# Patient Record
Sex: MALE | Race: WHITE | NOT HISPANIC OR LATINO | Employment: FULL TIME | URBAN - NONMETROPOLITAN AREA
[De-identification: names, ages, dates, MRNs, and addresses within clinical notes are randomized per-mention and may not be internally consistent; named-entity substitution may affect disease eponyms.]

---

## 2020-04-16 ENCOUNTER — OFFICE VISIT (OUTPATIENT)
Dept: URGENT CARE | Facility: CLINIC | Age: 45
End: 2020-04-16
Payer: COMMERCIAL

## 2020-04-16 VITALS
OXYGEN SATURATION: 97 % | SYSTOLIC BLOOD PRESSURE: 148 MMHG | HEIGHT: 71 IN | TEMPERATURE: 98.3 F | WEIGHT: 157 LBS | DIASTOLIC BLOOD PRESSURE: 96 MMHG | RESPIRATION RATE: 14 BRPM | HEART RATE: 93 BPM | BODY MASS INDEX: 21.98 KG/M2

## 2020-04-16 DIAGNOSIS — K52.9 AGE (ACUTE GASTROENTERITIS): ICD-10-CM

## 2020-04-16 PROCEDURE — 99203 OFFICE O/P NEW LOW 30 MIN: CPT | Performed by: FAMILY MEDICINE

## 2020-04-16 RX ORDER — LOPERAMIDE HYDROCHLORIDE 2 MG/1
2 CAPSULE ORAL 4 TIMES DAILY PRN
Qty: 30 CAP | Refills: 0 | Status: SHIPPED
Start: 2020-04-16

## 2020-04-16 NOTE — PROGRESS NOTES
"  Chief Complaint   Patient presents with   • Diarrhea                  Pt is here for \"work clearance\".    States that he had n/v/d for 2 days earlier this week.   Now feels much better.   No diarrhea or emesis for past 48 hrs     Able to tolerate normal diet.   No fever or cough             Past Medical History:   Diagnosis Date   • Abdominal pain, acute 2014   • Asthma    • Heart burn    • Hypertension    • Indigestion    • Kidney infection 2014   • Snoring        Social History     Tobacco Use   • Smoking status: Current Every Day Smoker     Packs/day: 1.50     Years: 21.00     Pack years: 31.50     Types: Cigarettes   • Smokeless tobacco: Never Used   Substance Use Topics   • Alcohol use: Yes   • Drug use: Yes     Types: Marijuana                  Review of Systems   Constitutional: Negative for fever, chills and weight loss.   Respiratory: Negative for cough and wheezing.    Cardiovascular: Negative for chest pain.   Gastrointestinal:   Negative for  blood in stool.   Neurological: Negative for dizziness and headaches.   All other systems reviewed and are negative.         Objective:     /96 (BP Location: Right arm, Patient Position: Sitting)   Pulse 93   Temp 36.8 °C (98.3 °F)   Resp 14   Ht 1.803 m (5' 11\")   Wt 71.2 kg (157 lb)   SpO2 97%       Physical Exam   Constitutional: pt is oriented to person, place, and time and appears well-developed. No distress.   HENT:   Head: Normocephalic and atraumatic.   Mouth/Throat: No oropharyngeal exudate.   Eyes: Conjunctivae are normal. No scleral icterus.   Cardiovascular: Normal rate, regular rhythm and normal heart sounds.    Pulmonary/Chest: Effort normal and breath sounds normal. No respiratory distress. Pt has no wheezes. Pt has no rales.   Abdominal: Normal appearance and bowel sounds are normal. There is no splenomegaly or hepatomegaly. There is no tenderness. There is no rebound, no guarding, no CVA tenderness and no tenderness at McBurney's " point.   Lymphadenopathy:     Pt has no cervical adenopathy.   Neurological: pt is alert and oriented to person, place, and time.   Skin: Skin is warm. Pt is not diaphoretic. No erythema.   Psychiatric:  behavior is normal.   Nursing note and vitals reviewed.              Assessment/Plan:         1. Viral gastroenteritis  Now resolved  Cleared for work

## 2020-04-16 NOTE — LETTER
April 16, 2020         Patient: Maroc Rivera   YOB: 1975   Date of Visit: 4/16/2020           To Whom it May Concern:    Marco Rivera was seen in my clinic on 4/16/2020. He may return to work on 4/16.    Please excuse him for his recent, prior work absence.    If you have any questions or concerns, please don't hesitate to call.        Sincerely,           Pierre Farah M.D.  Electronically Signed

## 2020-09-21 ENCOUNTER — OFFICE VISIT (OUTPATIENT)
Dept: URGENT CARE | Facility: CLINIC | Age: 45
End: 2020-09-21
Payer: COMMERCIAL

## 2020-09-21 VITALS
BODY MASS INDEX: 20.55 KG/M2 | HEART RATE: 72 BPM | WEIGHT: 146.8 LBS | DIASTOLIC BLOOD PRESSURE: 84 MMHG | HEIGHT: 71 IN | TEMPERATURE: 96.9 F | OXYGEN SATURATION: 94 % | SYSTOLIC BLOOD PRESSURE: 130 MMHG

## 2020-09-21 DIAGNOSIS — H00.036 EYELID ABSCESS, LEFT: ICD-10-CM

## 2020-09-21 PROCEDURE — 99214 OFFICE O/P EST MOD 30 MIN: CPT | Performed by: PHYSICIAN ASSISTANT

## 2020-09-21 RX ORDER — GABAPENTIN 300 MG/1
300 CAPSULE ORAL 3 TIMES DAILY
COMMUNITY

## 2020-09-21 RX ORDER — SULFAMETHOXAZOLE AND TRIMETHOPRIM 800; 160 MG/1; MG/1
1 TABLET ORAL 2 TIMES DAILY
Qty: 14 TAB | Refills: 0 | Status: SHIPPED | OUTPATIENT
Start: 2020-09-21 | End: 2020-09-28

## 2020-09-21 ASSESSMENT — ENCOUNTER SYMPTOMS
BLURRED VISION: 1
EYE DISCHARGE: 1
VOMITING: 0
EYE REDNESS: 1
NAUSEA: 0
EYE ITCHING: 0
EYE PAIN: 0
DOUBLE VISION: 0
FOREIGN BODY SENSATION: 0
FEVER: 0
PHOTOPHOBIA: 0

## 2020-09-21 NOTE — PROGRESS NOTES
Subjective:   Marco Rivera is a 45 y.o. male who presents today with   Chief Complaint   Patient presents with   • Eye Problem     (L) eye x 2 weeks; swollen, bump on top of eyelid; discharge       Eye Problem   The left eye is affected. This is a new problem. The current episode started 1 to 4 weeks ago. The problem occurs constantly. The problem has been gradually worsening. There was no injury mechanism. The pain is mild. There is no known exposure to pink eye. He does not wear contacts. Associated symptoms include blurred vision (mild occasional), an eye discharge and eye redness. Pertinent negatives include no double vision, fever, foreign body sensation, itching, nausea, photophobia, recent URI or vomiting. Treatments tried: warm compress. The treatment provided no relief.   Patient denies any injury or trauma and states he has had this in the past.  He says it feels like he has an ingrown hair near his eyebrow which started the swelling and redness.  They tried to pluck the area with tweezers.    PMH:  has a past medical history of Abdominal pain, acute (2014), Asthma, Heart burn, Hypertension, Indigestion, Kidney infection (2014), and Snoring.  MEDS:   Current Outpatient Medications:   •  gabapentin (NEURONTIN) 300 MG Cap, Take 300 mg by mouth 3 times a day., Disp: , Rfl:   •  sulfamethoxazole-trimethoprim (BACTRIM DS) 800-160 MG tablet, Take 1 Tab by mouth 2 times a day for 7 days., Disp: 14 Tab, Rfl: 0  •  albuterol (VENTOLIN OR PROVENTIL) 108 (90 BASE) MCG/ACT AERS inhalation aerosol, Inhale 2 Puffs by mouth every 6 hours as needed., Disp: , Rfl:   •  amlodipine (NORVASC) 5 MG TABS, Take 5 mg by mouth every day., Disp: , Rfl:   •  metoprolol (LOPRESSOR) 50 MG TABS, Take 50 mg by mouth 2 times a day., Disp: , Rfl:   •  Insulin Glargine (LANTUS SC), Inject  as instructed., Disp: , Rfl:   •  Insulin Regular Human (HUMULIN R INJ), by Injection route., Disp: , Rfl:   •  metoprolol (LOPRESSOR) 50 MG  TABS, Take 50 mg by mouth every day., Disp: , Rfl:   •  loperamide (IMODIUM) 2 MG Cap, Take 1 Cap by mouth 4 times a day as needed for Diarrhea. (Patient not taking: Reported on 9/21/2020), Disp: 30 Cap, Rfl: 0  •  Pancrelipase, Lip-Prot-Amyl, (VIOKACE) 47942 UNITS TABS, Take 1 Tab by mouth 4 times a day as needed. (Patient not taking: Reported on 9/21/2020), Disp: 120 Tab, Rfl: 0  •  oxycodone immediate release (ROXICODONE) 10 MG immediate release tablet, Take 1 Tab by mouth every four hours as needed for Moderate Pain ((4-6)). (Patient not taking: Reported on 9/21/2020), Disp: 30 Tab, Rfl: 0  •  HYDROmorphone (DILAUDID) 4 MG TABS, Take 0.5 Tabs by mouth every 3 hours as needed for Severe Pain. (Patient not taking: Reported on 9/21/2020), Disp: 30 Tab, Rfl: 0  •  lisinopril (PRINIVIL) 20 MG TABS, Take 20 mg by mouth every day., Disp: , Rfl:   •  pantoprazole (PROTONIX) 40 MG TBEC, Take 40 mg by mouth every day., Disp: , Rfl:   •  clotrimazole-betamethasone (LOTRISONE) 1-0.05 % CREA, Apply 1 Application to affected area(s) 2 times a day., Disp: , Rfl:   •  potassium chloride SA (K-DUR) 20 MEQ TBCR, Take 20 mEq by mouth 2 times a day., Disp: , Rfl:   •  pantoprazole (PROTONIX) 20 MG tablet, Take 20 mg by mouth 2 times a day., Disp: , Rfl:   •  lisinopril-hydrochlorothiazide (PRINZIDE, ZESTORETIC) 20-12.5 MG per tablet, Take 1 Tab by mouth every day., Disp: , Rfl:   •  amlodipine (NORVASC) 1 mg/mL SUSP, Take 5 mg by mouth every day., Disp: , Rfl:   •  hydrocodone/acetaminophen (NORCO)  MG TABS, Take 1-2 Tabs by mouth every 6 hours as needed., Disp: , Rfl:   ALLERGIES:   Allergies   Allergen Reactions   • Augmentin Rash and Nausea     rash   • Amoxicillin      Rash     • Augmentin    • Morphine      Abdominal pain     SURGHX:   Past Surgical History:   Procedure Laterality Date   • GASTROSCOPY WITH BIOPSY  12/5/2014    Performed by Brain Pelletier M.D. at [facility]   • EGD W/ENDOSCOPIC ULTRASOUND   "12/5/2014    Performed by Brain Pelletier M.D. at SUR[facility]   • CERVICAL FUSION POSTERIOR       SOCHX:  reports that he has been smoking cigarettes. He has a 31.50 pack-year smoking history. He has never used smokeless tobacco. He reports current alcohol use. He reports current drug use. Drug: Marijuana.  FH: Reviewed with patient, not pertinent to this visit.       Review of Systems   Constitutional: Negative for fever.   Eyes: Positive for blurred vision (mild occasional), discharge and redness. Negative for double vision, photophobia, pain and itching.        Eyelid pain (left)   Gastrointestinal: Negative for nausea and vomiting.   All other systems reviewed and are negative.       Objective:   /84 (BP Location: Right arm, Patient Position: Sitting)   Pulse 72   Temp 36.1 °C (96.9 °F) (Temporal)   Ht 1.803 m (5' 11\")   Wt 66.6 kg (146 lb 12.8 oz)   SpO2 94%   BMI 20.47 kg/m²   Physical Exam  Vitals signs and nursing note reviewed.   Constitutional:       General: He is not in acute distress.     Appearance: Normal appearance. He is well-developed.   HENT:      Head: Normocephalic and atraumatic.      Right Ear: Hearing normal.      Left Ear: Hearing normal.   Eyes:      General: No scleral icterus.        Right eye: Discharge present. No foreign body.         Left eye: Discharge present.No foreign body.      Extraocular Movements: Extraocular movements intact.      Conjunctiva/sclera:      Right eye: Right conjunctiva is injected (mild).      Left eye: Left conjunctiva is injected (mild).      Pupils: Pupils are equal, round, and reactive to light.        Comments: No signs of entrapment of the left eye. 2 cm area of swelling and erythema to the left eyelid, TTP to the area.   Cardiovascular:      Rate and Rhythm: Normal rate and regular rhythm.      Heart sounds: Normal heart sounds.   Pulmonary:      Effort: Pulmonary effort is normal.   Musculoskeletal:      Comments: Normal " movement in all 4 extremities   Skin:     General: Skin is warm and dry.   Neurological:      Mental Status: He is alert.      Coordination: Coordination normal.   Psychiatric:         Mood and Affect: Mood normal.           Assessment/Plan:   Assessment    1. Eyelid abscess, left  - sulfamethoxazole-trimethoprim (BACTRIM DS) 800-160 MG tablet; Take 1 Tab by mouth 2 times a day for 7 days.  Dispense: 14 Tab; Refill: 0    Other orders  - gabapentin (NEURONTIN) 300 MG Cap; Take 300 mg by mouth 3 times a day.  Continue with warm compress and start on oral antibiotic today.  Patient was given ophthalmology locations and phone numbers for follow-up as I recommended if it is not getting any better.  If the swelling is getting worse patient was directed to go to the emergency room at that time. Likely infected cyst to the left upper eyelid.   Differential diagnosis, natural history, supportive care, and indications for immediate follow-up discussed.   Patient given instructions and understanding of medications and treatment.    If not improving in 3-5 days, F/U with PCP or return to  if symptoms worsen.    Patient agreeable to plan.      Please note that this dictation was created using voice recognition software. I have made every reasonable attempt to correct obvious errors, but I expect that there are errors of grammar and possibly content that I did not discover before finalizing the note.    Diaz Fiore PA-C